# Patient Record
Sex: FEMALE | Race: OTHER | Employment: UNEMPLOYED | URBAN - METROPOLITAN AREA
[De-identification: names, ages, dates, MRNs, and addresses within clinical notes are randomized per-mention and may not be internally consistent; named-entity substitution may affect disease eponyms.]

---

## 2020-02-25 ENCOUNTER — APPOINTMENT (OUTPATIENT)
Dept: ULTRASOUND IMAGING | Age: 45
End: 2020-02-25

## 2020-02-25 ENCOUNTER — HOSPITAL ENCOUNTER (EMERGENCY)
Age: 45
Discharge: HOME | End: 2020-02-25
Attending: EMERGENCY MEDICINE

## 2020-02-25 VITALS
TEMPERATURE: 98.3 F | HEART RATE: 75 BPM | BODY MASS INDEX: 22.5 KG/M2 | WEIGHT: 140 LBS | HEIGHT: 66 IN | RESPIRATION RATE: 18 BRPM | OXYGEN SATURATION: 100 % | SYSTOLIC BLOOD PRESSURE: 115 MMHG | DIASTOLIC BLOOD PRESSURE: 75 MMHG

## 2020-02-25 LAB
ABO/RH: NORMAL
ALBUMIN SERPL-MCNC: 4.3 GM/DL (ref 3.4–5)
ALP BLD-CCNC: 47 IU/L (ref 40–129)
ALT SERPL-CCNC: 9 U/L (ref 10–40)
ANION GAP SERPL CALCULATED.3IONS-SCNC: 10 MMOL/L (ref 4–16)
AST SERPL-CCNC: 18 IU/L (ref 15–37)
BACTERIA: NEGATIVE /HPF
BASOPHILS ABSOLUTE: 0 K/CU MM
BASOPHILS RELATIVE PERCENT: 0.5 % (ref 0–1)
BILIRUB SERPL-MCNC: 0.6 MG/DL (ref 0–1)
BILIRUBIN URINE: NEGATIVE MG/DL
BLOOD, URINE: ABNORMAL
BUN BLDV-MCNC: 6 MG/DL (ref 6–23)
CALCIUM SERPL-MCNC: 9.9 MG/DL (ref 8.3–10.6)
CHLORIDE BLD-SCNC: 101 MMOL/L (ref 99–110)
CLARITY: ABNORMAL
CO2: 25 MMOL/L (ref 21–32)
COLOR: YELLOW
CREAT SERPL-MCNC: 0.6 MG/DL (ref 0.6–1.1)
DIFFERENTIAL TYPE: ABNORMAL
EOSINOPHILS ABSOLUTE: 0.1 K/CU MM
EOSINOPHILS RELATIVE PERCENT: 0.9 % (ref 0–3)
GFR AFRICAN AMERICAN: >60 ML/MIN/1.73M2
GFR NON-AFRICAN AMERICAN: >60 ML/MIN/1.73M2
GLUCOSE BLD-MCNC: 90 MG/DL (ref 70–99)
GLUCOSE, URINE: NEGATIVE MG/DL
GONADOTROPIN, CHORIONIC (HCG) QUANT: NORMAL UIU/ML
HCT VFR BLD CALC: 39.5 % (ref 37–47)
HEMOGLOBIN: 13.5 GM/DL (ref 12.5–16)
IMMATURE NEUTROPHIL %: 0.3 % (ref 0–0.43)
KETONES, URINE: NEGATIVE MG/DL
LEUKOCYTE ESTERASE, URINE: NEGATIVE
LYMPHOCYTES ABSOLUTE: 1.7 K/CU MM
LYMPHOCYTES RELATIVE PERCENT: 21.4 % (ref 24–44)
MCH RBC QN AUTO: 28.7 PG (ref 27–31)
MCHC RBC AUTO-ENTMCNC: 34.2 % (ref 32–36)
MCV RBC AUTO: 83.9 FL (ref 78–100)
MONOCYTES ABSOLUTE: 0.6 K/CU MM
MONOCYTES RELATIVE PERCENT: 7.2 % (ref 0–4)
MUCUS: ABNORMAL HPF
NITRITE URINE, QUANTITATIVE: NEGATIVE
NUCLEATED RBC %: 0 %
PDW BLD-RTO: 11.9 % (ref 11.7–14.9)
PH, URINE: 8 (ref 5–8)
PLATELET # BLD: 267 K/CU MM (ref 140–440)
PMV BLD AUTO: 9.8 FL (ref 7.5–11.1)
POTASSIUM SERPL-SCNC: 4.3 MMOL/L (ref 3.5–5.1)
PROTEIN UA: NEGATIVE MG/DL
RBC # BLD: 4.71 M/CU MM (ref 4.2–5.4)
RBC URINE: 2 /HPF (ref 0–6)
SEGMENTED NEUTROPHILS ABSOLUTE COUNT: 5.6 K/CU MM
SEGMENTED NEUTROPHILS RELATIVE PERCENT: 69.7 % (ref 36–66)
SODIUM BLD-SCNC: 136 MMOL/L (ref 135–145)
SPECIFIC GRAVITY UA: 1.01 (ref 1–1.03)
SQUAMOUS EPITHELIAL: 2 /HPF
TOTAL IMMATURE NEUTOROPHIL: 0.02 K/CU MM
TOTAL NUCLEATED RBC: 0 K/CU MM
TOTAL PROTEIN: 7.6 GM/DL (ref 6.4–8.2)
TRICHOMONAS: ABNORMAL /HPF
UROBILINOGEN, URINE: NORMAL MG/DL (ref 0.2–1)
WBC # BLD: 8 K/CU MM (ref 4–10.5)
WBC UA: <1 /HPF (ref 0–5)

## 2020-02-25 PROCEDURE — 84702 CHORIONIC GONADOTROPIN TEST: CPT

## 2020-02-25 PROCEDURE — 76817 TRANSVAGINAL US OBSTETRIC: CPT

## 2020-02-25 PROCEDURE — 86900 BLOOD TYPING SEROLOGIC ABO: CPT

## 2020-02-25 PROCEDURE — 81001 URINALYSIS AUTO W/SCOPE: CPT

## 2020-02-25 PROCEDURE — 86901 BLOOD TYPING SEROLOGIC RH(D): CPT

## 2020-02-25 PROCEDURE — 93975 VASCULAR STUDY: CPT

## 2020-02-25 PROCEDURE — 80053 COMPREHEN METABOLIC PANEL: CPT

## 2020-02-25 PROCEDURE — 85025 COMPLETE CBC W/AUTO DIFF WBC: CPT

## 2020-02-25 PROCEDURE — 99284 EMERGENCY DEPT VISIT MOD MDM: CPT

## 2020-02-25 NOTE — ED NOTES
Ryanne. Aleksandra Adame [address] paged Dr Christiano Cruz  02/25/20 [phone] called Dr Shu Mcqueen on cell phone     Keira Courtney  02/25/20 9101

## 2020-02-25 NOTE — ED NOTES
Patient given discharge instructions and follow up information with .       Brittney Thomson RN  02/25/20 [id number]

## 2020-02-25 NOTE — ED PROVIDER NOTES
Triage Chief Complaint:   Vaginal Bleeding (12 weeks pregnant approx, vag spotting started today, denies pain, [facility], 5th pregnancy with 3 living children)      HPI:  Rebecca Monson Dotnunu Maharaj is a 45 y.o. female that presents with vaginal bleeding. She states onset of vaginal bleeding yesterday which was initially spotting. She states that it slowed down and now she only has bleeding when she urinates. She is not had a steady flow like a period. She denies abdominal pain. She states it was a little uncomfortable having the ultrasound done but denies pain otherwise. She states she had her last period on  and had a positive pregnancy test at home. She reports 4 previous pregnancies, has 3 living children and 1 previous . [facility] [employer] [facility]  was used for history and physical.     ROS:  General:  No fevers, no chills  Eyes:  No recent vison changes  ENT:  No sore throat, no nasal congestion  Cardiovascular:  No chest pain, no palpitations  Respiratory:  No shortness of breath, no cough, no wheezing  Gastrointestinal:  No pain, no nausea or vomiting  Musculoskeletal:  No muscle pain, no joint pain  Skin:  No rash, no pruritis, no easy bruising  Neurologic:  No headache or weakness  Genitourinary:  No dysuria, no hematuria  Extremities:  no edema, no pain    History reviewed. No pertinent past medical history. History reviewed. No pertinent surgical history. History reviewed. No pertinent family history.   Social History     Socioeconomic History    Marital status:      Spouse name: Not on file    Number of children: Not on file    Years of education: Not on file    Highest education level: Not on file   Occupational History    Not on file   Social Needs    Financial resource strain: Not on file    Food insecurity:     Worry: Not on file     Inability: Not on file    Transportation needs:     Medical: Not on file     Non-medical: Not on file   Tobacco Use    Smoking status: Never Smoker    Smokeless tobacco: Never Used   Substance and Sexual Activity    Alcohol use: Not Currently    Drug use: Never    Sexual activity: Not on file   Lifestyle    Physical activity:     Days per week: Not on file     Minutes per session: Not on file    Stress: Not on file   Relationships    Social connections:     Talks on phone: Not on file     Gets together: Not on file     Attends Che[employer] service: Not on file     Active member of club or organization: Not on file     Attends meetings of clubs or organizations: Not on file     Relationship status: Not on file    Intimate partner violence:     Fear of current or ex partner: Not on file     Emotionally abused: Not on file     Physically abused: Not on file     Forced sexual activity: Not on file   Other Topics Concern    Not on file   Social History Narrative    Not on file     No current facility-administered medications for this encounter. No current outpatient medications on file. No Known Allergies    Nursing Notes Reviewed    Physical Exam:  ED Triage Vitals [02/25/20 1339]   Enc Vitals Group      /68      Pulse 89      Resp 16      Temp 98.3 °F (36.8 °C)      Temp Source Oral      SpO2 100 %      Weight 140 lb (63.5 kg)      Height 5' 6.14\" (1.68 m)      Head Circumference       Peak Flow       Pain Score       Pain Loc       Pain Edu? Excl. in [address]? General appearance: Awake, alert, No acute distress. Neck:  Supple without rigidity  ENT: Normal posterior pharynx, moist mucus membranes  Heart:  Regular rate and rhythm, no murmurs  Respiratory:  Lungs clear to auscultation bilaterally. Normal effort. Abdominal: No tenderness to palpation, no rebound guarding or rigidity, normal bowel sounds, no masses, no organomegaly   Extremity: normal ROM, no edema  Skin: Warm. Dry. No rash. Neurological:  Alert and oriented. No focal deficits.  Speech normal.  Psyc: Normal mood and affect    I have reviewed and interpreted all of the currently available lab results from this visit (if applicable):  Results for orders placed or performed during the hospital encounter of 02/25/20   CBC auto diff   Result Value Ref Range    WBC 8.0 4.0 - 10.5 K/CU MM    RBC 4.71 4.2 - 5.4 M/CU MM    Hemoglobin 13.5 12.5 - 16.0 GM/DL    Hematocrit 39.5 37 - 47 %    MCV 83.9 78 - 100 FL    MCH 28.7 27 - 31 PG    MCHC 34.2 32.0 - 36.0 %    RDW 11.9 11.7 - 14.9 %    Platelets 943 201 - 496 K/CU MM    MPV 9.8 7.5 - 11.1 FL    Differential Type AUTOMATED DIFFERENTIAL     Segs Relative 69.7 (H) 36 - 66 %    Lymphocytes % 21.4 (L) 24 - 44 %    Monocytes % 7.2 (H) 0 - 4 %    Eosinophils % 0.9 0 - 3 %    Basophils % 0.5 0 - 1 %    Segs Absolute 5.6 K/CU MM    Lymphocytes Absolute 1.7 K/CU MM    Monocytes Absolute 0.6 K/CU MM    Eosinophils Absolute 0.1 K/CU MM    Basophils Absolute 0.0 K/CU MM    Nucleated RBC % 0.0 %    Total Nucleated RBC 0.0 K/CU MM    Total Immature Neutrophil 0.02 K/CU MM    Immature Neutrophil % 0.3 0 - 0.43 %   CMP   Result Value Ref Range    Sodium 136 135 - 145 MMOL/L    Potassium 4.3 3.5 - 5.1 MMOL/L    Chloride 101 99 - 110 mMol/L    CO2 25 21 - 32 MMOL/L    BUN 6 6 - 23 MG/DL    CREATININE 0.6 0.6 - 1.1 MG/DL    Glucose 90 70 - 99 MG/DL    Calcium 9.9 8.3 - 10.6 MG/DL    Alb 4.3 3.4 - 5.0 GM/DL    Total Protein 7.6 6.4 - 8.2 GM/DL    Total Bilirubin 0.6 0.0 - 1.0 MG/DL    ALT 9 (L) 10 - 40 U/L    AST 18 15 - 37 IU/L    Alkaline Phosphatase 47 40 - 129 IU/L    GFR Non-African American >60 >60 mL/min/1.73m2    GFR African American >60 >60 mL/min/1.73m2    Anion Gap 10 4 - 16   HCG Serum, Quantitative   Result Value Ref Range    hCG Quant 9553.0                                          UIU/ML    hCG Quant EXPECTED VALUES IN PREGNANCY UIU/ML    hCG Quant    7-50               0.2-1 WEEK UIU/ML    hCG Quant                 1-2 WEEKS UIU/ML    hCG Quant right ovary. Left ovary: The left ovary measures 3.4 x 1.4 x 1.6 cm and has a normal appearance. Free fluid: No free fluid is identified. Abnormal appearing elongated gestational sac which is elongated extending from the fundus of the uterus to the lower uterine segment. There are internal contents but no definitive normal yolk sac or fetal pole. The findings are most suggestive of a failed intrauterine pregnancy. The above findings were discussed with Dr. Gonzalo Cazares at 4:40 p.m. on 02/25/2020. Us Dup Abd Pel Retro Scrot Complete    Result Date: 2/25/2020  EXAMINATION: DOPPLER EVALUATION OF THE PELVIS; FIRST TRIMESTER OBSTETRIC ULTRASOUND 2/25/2020 TECHNIQUE: DOPPLER ULTRASOUND OF THE PELVIS; Transvaginal first trimester obstetric pelvic ultrasound was performed with color Doppler flow evaluation. COMPARISON: None HISTORY: ORDERING SYSTEM PROVIDED HISTORY: vaginal bleeding TECHNOLOGIST PROVIDED HISTORY: Reason for exam:->vaginal bleeding Reason for Exam: vaginal spotting during pregnancy Acuity: Acute Type of Exam: Initial Additional signs and symptoms: nk Relevant Medical/Surgical History: nk; ORDERING SYSTEM PROVIDED HISTORY: 12 weeks preg, bleeding and pain TECHNOLOGIST PROVIDED HISTORY: Reason for exam:->12 weeks preg, bleeding and pain Reason for Exam: vaginal spotting during pregnancy Acuity: Acute Type of Exam: Initial Additional signs and symptoms: nk Relevant Medical/Surgical History: nk FINDINGS: Uterus: The uterus measures 10.0 x 7.5 x 5.8 cm. Gestational Sac(s):  A normal yolk sac is not visualized. There is an irregular appearing yolk sac containing heterogeneous internal contents. This yolk sac extends from the fundus of the uterus to the lower uterine segment, elongated in appearance. There is no definitive fetal pole identified. There is no definitive yolk sac identified. There is a suggestion of an abnormally enlarged yolk sac. No cardiac activity is identified. Right ovary:  The right